# Patient Record
Sex: MALE | Race: WHITE
[De-identification: names, ages, dates, MRNs, and addresses within clinical notes are randomized per-mention and may not be internally consistent; named-entity substitution may affect disease eponyms.]

---

## 2017-01-11 ENCOUNTER — HOSPITAL ENCOUNTER (OUTPATIENT)
Dept: HOSPITAL 58 - OUTPT | Age: 57
End: 2017-01-11
Attending: OTOLARYNGOLOGY

## 2017-01-11 VITALS — BODY MASS INDEX: 43.2 KG/M2

## 2017-01-11 DIAGNOSIS — H91.90: Primary | ICD-10-CM

## 2017-01-11 PROCEDURE — 92557 COMPREHENSIVE HEARING TEST: CPT

## 2017-01-11 PROCEDURE — 92567 TYMPANOMETRY: CPT

## 2017-03-21 ENCOUNTER — HOSPITAL ENCOUNTER (OUTPATIENT)
Dept: HOSPITAL 58 - RAD | Age: 57
Discharge: HOME | End: 2017-03-21
Attending: INTERNAL MEDICINE

## 2017-03-21 VITALS — BODY MASS INDEX: 43.2 KG/M2

## 2017-03-21 DIAGNOSIS — M54.9: ICD-10-CM

## 2017-03-21 DIAGNOSIS — M25.551: Primary | ICD-10-CM

## 2017-03-21 NOTE — DI
Exam:  Lumbar spine three-view AP and lateral study 

  

History:  Pain 

  

Findings:  Full length AP and lateral views of the lumbar spine were obtained as well as a spot late
ral view of the lumbosacral angle.  There appear to be five non-rib bearing, non sacralized lumbar v
ertebrae.  Heights of the lumbar vertebrae and associated disc spaces appear to be relatively intact
.  There is a mild degree of multilevel endplate spurring noted throughout the bulk of the lumbar sp
ine. 

  

Impression:  Very mild degree of multilevel endplate spurring.  No evidence of fracture, malalignmen
t nor significant lumbar spine compression deformity in this patient with five functioning, non-rib 
bearing, non sacralized lumbar vertebrae.

## 2017-03-21 NOTE — DI
Exam:  Right hip two views 

  

History:  Right hip pain 

  

Findings:  AP and frog lateral views of the right hip were obtained and demonstrates degenerative sp
urring and soft tissue calcifications in relationship to the superior lateral right acetabular rim. 
 There is also degenerative spurring and soft tissue calcifications in relationship to the greater t
rochanter.  No evidence of fracture, dislocation nor evidence of hip joint space narrowing noted. 

  

IMPRESSION:  Arthritic changes in relationship to the acetabular rim and right greater trochanter wi
thout evidence of fracture nor disruption of the right hip joint space and articulation.

## 2017-03-31 ENCOUNTER — HOSPITAL ENCOUNTER (OUTPATIENT)
Dept: HOSPITAL 58 - RAD | Age: 57
Discharge: HOME | End: 2017-03-31
Attending: INTERNAL MEDICINE

## 2017-03-31 VITALS — BODY MASS INDEX: 43.2 KG/M2

## 2017-03-31 DIAGNOSIS — M54.5: ICD-10-CM

## 2017-03-31 DIAGNOSIS — M25.551: Primary | ICD-10-CM

## 2017-03-31 NOTE — CT
EXAM:  CT of the right lower extremity without contrast 

  

History:  Right hip pain. 

  

Comparison:  Right hip radiograph 03/21/2017 

  

Technique:  Multiplanar CT images through the right hip were obtained without the administration of 
IV contrast 

  

Findings: 

  

No acute fracture or dislocation.  Mild enthesiopathy of the greater trochanter.  Mild narrowing of 
the right hip joint.  Increased acetabular coverage of the right femoral head.  Osseous density seen
 involving the superior acetabular rim may be related to old trauma or accessory ossicle. 

  

Impression: 

1.  No acute osseous abnormality. 

2.  Mild arthritis of the right hip joint. 

3.  Increased acetabular coverage of the right femoral head can predispose to femoral acetabular imp
ingement syndrome.  If symptoms persist, recommend further evaluation with MRI.

## 2017-03-31 NOTE — CT
EXAM:  CT of the lumbar spine without contrast 

  

History:  Lower back pain. 

  

Comparison:  Lumbar spine radiograph 03/21/2017 

  

Technique:  Multiplanar CT images through the lumbar spine were obtained without the administration 
of IV contrast 

  

Findings:  Mild atherosclerotic vascular calcifications of the visualized abdominal aorta. 

  

No acute fracture or subluxation.  Mild multilevel degenerative disc space narrowing with a few smal
l anterior osteophytes. 

  

T12-L1: A small posterior disc osteophyte complex with no significant bony central canal stenosis an
d no bony neural foraminal narrowing. 

  

L1-L2:  No significant disc bulge, central canal stenosis or bony neural foraminal narrowing. 

  

L2-L3:  No significant disc bulge, central canal stenosis or bony neural foraminal narrowing. 

  

L3-L4:  No significant disc bulge or central canal stenosis.  Moderate left and mild to moderate rig
ht bony neural foraminal narrowing secondary to ligamentous and facet perching. 

  

L4-L5:  Small disc bulge effacing the anterior thecal sac but no significant bony central canal sten
osis. Moderate left and mild to moderate right bony neural foraminal narrowing secondary to ligament
ous and facet hypertrophy. 

  

L5, S1: No significant disc bulge or central canal stenosis.  Mild to moderate bilateral bony neural
 foraminal narrowing secondary to ligamentous and facet hypertrophy. 

  

Impression: 

1.  No acute osseous abnormality of the lumbar spine. 

2.  Level by level analysis as detailed above.

## 2018-02-01 ENCOUNTER — HOSPITAL ENCOUNTER (OUTPATIENT)
Dept: HOSPITAL 58 - LAB | Age: 58
Discharge: HOME | End: 2018-02-01
Attending: INTERNAL MEDICINE

## 2018-02-01 VITALS — BODY MASS INDEX: 43.2 KG/M2

## 2018-02-01 DIAGNOSIS — E88.81: Primary | ICD-10-CM

## 2018-02-01 PROCEDURE — 36415 COLL VENOUS BLD VENIPUNCTURE: CPT

## 2018-02-01 PROCEDURE — 82533 TOTAL CORTISOL: CPT

## 2018-08-16 ENCOUNTER — HOSPITAL ENCOUNTER (OUTPATIENT)
Dept: HOSPITAL 58 - CAR | Age: 58
Discharge: HOME | End: 2018-08-16
Attending: INTERNAL MEDICINE

## 2018-08-16 VITALS — BODY MASS INDEX: 43.2 KG/M2

## 2018-08-16 DIAGNOSIS — I25.10: ICD-10-CM

## 2018-08-16 DIAGNOSIS — R06.02: Primary | ICD-10-CM

## 2018-08-17 ENCOUNTER — HOSPITAL ENCOUNTER (OUTPATIENT)
Dept: HOSPITAL 58 - CAR | Age: 58
Discharge: HOME | End: 2018-08-17
Attending: INTERNAL MEDICINE

## 2018-08-17 VITALS — BODY MASS INDEX: 43.2 KG/M2

## 2018-08-17 DIAGNOSIS — R06.02: Primary | ICD-10-CM

## 2018-08-17 DIAGNOSIS — I25.10: ICD-10-CM

## 2018-08-17 DIAGNOSIS — Z95.5: ICD-10-CM

## 2018-08-17 NOTE — ECHOCOLOR
Date of Exam: 8/16/18   Ordering Physician: DR. WILLIAM OGDEN

Reason for Echo: SOB, CAD WITH STENT, MORBID OBESITY







 M-Mode  Normal Adult Results LV Dimensions Normal Adult Results

 

AoV Opening excursions       >1.6  >1.6 LVEDD-base-    3.5-5.8  6.1

 

Ao root dimensions     2.0-3.7  4.1 LVESD-base-    3.1-4.6  

 

L. Atrium dimensions     1.9-3.8  4.8 Post. Wall thickness    0.8-1.1  1.2

 

IV septum (thickness)     0.7-1.2  1.5 Post. Wall excursion    0.72-1.3  NORMAL

 

Septal motion   0.5 Systolic motion   

 

R. Ventricular cavity    1.5-2.0  NORMAL LVEF      60%  57%

 

Paradoxical septal wall motion   NORMAL    



2-D: DILATED LEFT ATRIAL AND LEFT VENTRICLE CAVITIES--NORMAL VALVES--NO EFFUSION
, NO THROMBUS, HYPOKINETIC SEPTUM



M-MODE:

MV:  NORMAL

AV:  NORMAL

TV:  NORMAL

PV:  

CHAMBER SIZE:  ENLARGED LEFT ATRIAL AND LEFT VENTRICLE CAVITIES

WALL MOTION:  HYPOKINETIC SEPTUM

PERICARDIUM:  NORMAL

_______________________________________________________

INTERPRETATION:  

1. LEFT VENTRICULAR HYPERTROPHY WITH ENLARGED LEFT ATRIAL CAVITY (4.8 CM)

2. HYPOKINETIC SEPTAL WALL

3. LEFT VENTRICULAR EJECTION FRACTION 57%

4. DILATED TO AORTIC ROOT
MTDD